# Patient Record
Sex: FEMALE | Race: BLACK OR AFRICAN AMERICAN | NOT HISPANIC OR LATINO | URBAN - NONMETROPOLITAN AREA
[De-identification: names, ages, dates, MRNs, and addresses within clinical notes are randomized per-mention and may not be internally consistent; named-entity substitution may affect disease eponyms.]

---

## 2017-03-30 PROBLEM — H47.292: Noted: 2017-03-30

## 2017-03-30 PROBLEM — H52.4: Noted: 2017-03-30

## 2017-03-30 PROBLEM — H52.223: Noted: 2017-03-30

## 2017-03-30 PROBLEM — E11.9: Noted: 2017-03-30

## 2019-09-26 NOTE — PATIENT DISCUSSION
09/26/2019[facility]-[id number]&nbsp;SN &nbsp; &nbsp; tld

## 2019-09-26 NOTE — PATIENT DISCUSSION
MYOPIA/ASTIGMATISM - STABLE OD, INCREASED CYL OS. UPDATED GLASSES AND CL RX TODAY. RECOMMEND SYNC 5 + BLUE LIGHT FILTER ON NEW GLASSES TO ALLEVIATE ACCOMMODATIVE SPASM. MONITOR X 1 YEAR.

## 2020-09-14 ENCOUNTER — IMPORTED ENCOUNTER (OUTPATIENT)
Dept: URBAN - NONMETROPOLITAN AREA CLINIC 1 | Facility: CLINIC | Age: 65
End: 2020-09-14

## 2020-09-14 PROCEDURE — 99214 OFFICE O/P EST MOD 30 MIN: CPT

## 2020-09-14 NOTE — PATIENT DISCUSSION
Optic atrophy LE - stable. S/P CVA x 2. 1999 and 2002. DM s DR-Stressed the importance of keeping blood sugars under control blood pressure under control and weight normalization and regular visits with PCP. -Explained the possible effects of poorly controlled diabetes and the damage that diabetes can cause to ocular health. -Patient to check HgbA1C.-Pt instructed to contact our office with any vision changes. Astigmatism-Discussed diagnosis with patient. Presbyopia-Discussed diagnosis with patient. Updated spec Rx given.   Recommend lens that will provide comfort as well as protect safety and health of eyes.; Dr's Notes: PCP AT [facility]

## 2022-04-09 ASSESSMENT — TONOMETRY
OS_IOP_MMHG: 15
OD_IOP_MMHG: 15

## 2022-04-09 ASSESSMENT — VISUAL ACUITY
OD_SC: 20/25-1
OS_SC: CF4'
OU_CC: J1+